# Patient Record
Sex: FEMALE | Race: WHITE | NOT HISPANIC OR LATINO | Employment: FULL TIME | URBAN - NONMETROPOLITAN AREA
[De-identification: names, ages, dates, MRNs, and addresses within clinical notes are randomized per-mention and may not be internally consistent; named-entity substitution may affect disease eponyms.]

---

## 2023-03-08 PROBLEM — K42.9 HERNIA, UMBILICAL: Status: ACTIVE | Noted: 2023-03-08

## 2023-03-08 PROBLEM — L98.9 SKIN LESION: Status: ACTIVE | Noted: 2023-03-08

## 2023-03-08 PROBLEM — M54.50 LOW BACK PAIN: Status: ACTIVE | Noted: 2023-03-08

## 2023-03-08 PROBLEM — B00.9 HERPES SIMPLEX: Status: ACTIVE | Noted: 2023-03-08

## 2023-03-08 PROBLEM — N93.9 ABNORMAL UTERINE BLEEDING (AUB): Status: ACTIVE | Noted: 2023-03-08

## 2023-03-08 PROBLEM — D50.9 IRON DEFICIENCY ANEMIA: Status: ACTIVE | Noted: 2023-03-08

## 2023-03-08 PROBLEM — M54.41 RIGHT-SIDED LOW BACK PAIN WITH SCIATICA: Status: ACTIVE | Noted: 2023-03-08

## 2023-03-08 PROBLEM — E03.9 HYPOTHYROIDISM: Status: ACTIVE | Noted: 2023-03-08

## 2023-03-08 PROBLEM — E66.01 MORBID OBESITY (MULTI): Status: ACTIVE | Noted: 2023-03-08

## 2023-03-08 RX ORDER — LEVOTHYROXINE SODIUM 100 UG/1
1 TABLET ORAL DAILY
COMMUNITY
Start: 2021-02-04 | End: 2024-03-21 | Stop reason: SDUPTHER

## 2023-03-08 RX ORDER — LEVONORGESTREL 52 MG/1
INTRAUTERINE DEVICE INTRAUTERINE
COMMUNITY

## 2023-03-15 ENCOUNTER — OFFICE VISIT (OUTPATIENT)
Dept: PRIMARY CARE | Facility: CLINIC | Age: 33
End: 2023-03-15
Payer: MEDICAID

## 2023-03-15 VITALS
DIASTOLIC BLOOD PRESSURE: 74 MMHG | WEIGHT: 280 LBS | HEART RATE: 91 BPM | SYSTOLIC BLOOD PRESSURE: 107 MMHG | BODY MASS INDEX: 42.44 KG/M2 | HEIGHT: 68 IN

## 2023-03-15 DIAGNOSIS — L03.316 CELLULITIS, UMBILICAL: ICD-10-CM

## 2023-03-15 DIAGNOSIS — E03.9 ACQUIRED HYPOTHYROIDISM: Primary | ICD-10-CM

## 2023-03-15 DIAGNOSIS — Z00.00 WELLNESS EXAMINATION: ICD-10-CM

## 2023-03-15 DIAGNOSIS — B00.9 HERPES SIMPLEX: ICD-10-CM

## 2023-03-15 DIAGNOSIS — E66.01 CLASS 3 SEVERE OBESITY DUE TO EXCESS CALORIES WITHOUT SERIOUS COMORBIDITY WITH BODY MASS INDEX (BMI) OF 40.0 TO 44.9 IN ADULT (MULTI): ICD-10-CM

## 2023-03-15 DIAGNOSIS — K42.9 UMBILICAL HERNIA WITHOUT OBSTRUCTION AND WITHOUT GANGRENE: ICD-10-CM

## 2023-03-15 PROBLEM — E66.813 CLASS 3 SEVERE OBESITY DUE TO EXCESS CALORIES WITH BODY MASS INDEX (BMI) OF 40.0 TO 44.9 IN ADULT: Status: ACTIVE | Noted: 2023-03-08

## 2023-03-15 PROCEDURE — 3008F BODY MASS INDEX DOCD: CPT | Performed by: INTERNAL MEDICINE

## 2023-03-15 PROCEDURE — 99214 OFFICE O/P EST MOD 30 MIN: CPT | Performed by: INTERNAL MEDICINE

## 2023-03-15 RX ORDER — MUPIROCIN CALCIUM 20 MG/G
CREAM TOPICAL 3 TIMES DAILY
Qty: 30 G | Refills: 0 | Status: SHIPPED | OUTPATIENT
Start: 2023-03-15 | End: 2023-03-25

## 2023-03-15 RX ORDER — DOXYCYCLINE 100 MG/1
100 CAPSULE ORAL 2 TIMES DAILY
Qty: 28 CAPSULE | Refills: 0 | Status: SHIPPED | OUTPATIENT
Start: 2023-03-15 | End: 2023-03-29

## 2023-03-15 ASSESSMENT — ENCOUNTER SYMPTOMS
ACTIVITY CHANGE: 0
BACK PAIN: 0
COUGH: 0
ABDOMINAL DISTENTION: 0
NAUSEA: 0
NUMBNESS: 0
VOMITING: 0
WEAKNESS: 0
FATIGUE: 0
APPETITE CHANGE: 0
SORE THROAT: 0
SINUS PAIN: 0
WHEEZING: 0
CHILLS: 0
DIARRHEA: 0
SINUS PRESSURE: 0
SHORTNESS OF BREATH: 0

## 2023-03-15 ASSESSMENT — PATIENT HEALTH QUESTIONNAIRE - PHQ9
2. FEELING DOWN, DEPRESSED OR HOPELESS: NOT AT ALL
1. LITTLE INTEREST OR PLEASURE IN DOING THINGS: NOT AT ALL
SUM OF ALL RESPONSES TO PHQ9 QUESTIONS 1 AND 2: 0

## 2023-03-15 NOTE — ASSESSMENT & PLAN NOTE
- did see Dr Ferrell, told needs to loose weight to have it fixed, has lost 25 lbs, discussed can loose another 15-20 lbs to have her bmi at 39 to hopefully have it fixed

## 2023-03-15 NOTE — PROGRESS NOTES
"Subjective   Patient ID: Cj Bustos is a 32 y.o. female who presents for Annual Exam (1 year), Hernia (Umbilical hernia/Present for 4+ years/Pt feels her belly button is infected because of it/She was referred to surgery; but was told she was too overweight to have surgery), Rash (Located to the top of her pelvic area and abdomen/Pt does reports itchiness /Present for about a month/Unsure if its from her infected belly button), and Thyroid Problem (Pt reports not taking her medication often).    Rash  Pertinent negatives include no congestion, cough, diarrhea, fatigue, shortness of breath, sore throat or vomiting.   Thyroid Problem  Patient reports no diarrhea or fatigue.       Patient is here  today for annual follow up   Pt reports that she has been having drainage and discomfort in her umbilical area. She did see Dr Ferrell last year who told her that she was too overweight to have surgery.     She has lost about 25 lbs since her last appt.  She has changed her eating habits and smaller portions.     Review of Systems   Constitutional:  Negative for activity change, appetite change, chills and fatigue.   HENT:  Negative for congestion, postnasal drip, sinus pressure, sinus pain and sore throat.    Respiratory:  Negative for cough, shortness of breath and wheezing.    Cardiovascular:  Negative for chest pain and leg swelling.   Gastrointestinal:  Negative for abdominal distention, diarrhea, nausea and vomiting.   Musculoskeletal:  Negative for back pain.   Skin:  Positive for rash.   Neurological:  Negative for weakness and numbness.       Objective   /74 (BP Location: Right arm, Patient Position: Sitting, BP Cuff Size: Adult)   Pulse 91   Ht 1.727 m (5' 8\")   Wt 127 kg (280 lb)   BMI 42.57 kg/m²     Physical Exam  Constitutional:       General: She is not in acute distress.     Appearance: Normal appearance.   HENT:      Head: Normocephalic.      Nose: Nose normal.      Mouth/Throat:      Mouth: " Mucous membranes are dry.      Pharynx: No oropharyngeal exudate.   Eyes:      General:         Right eye: No discharge.         Left eye: No discharge.      Extraocular Movements: Extraocular movements intact.      Pupils: Pupils are equal, round, and reactive to light.   Cardiovascular:      Rate and Rhythm: Normal rate and regular rhythm.      Heart sounds: No murmur heard.     No gallop.   Pulmonary:      Effort: Pulmonary effort is normal. No respiratory distress.      Breath sounds: Normal breath sounds. No wheezing.   Musculoskeletal:         General: No swelling. Normal range of motion.   Skin:     General: Skin is warm and dry.      Coloration: Skin is not jaundiced.      Comments: +umbilical hernia present, purulent drainge around belly button, erythema   Neurological:      General: No focal deficit present.      Mental Status: She is alert and oriented to person, place, and time.      Cranial Nerves: No cranial nerve deficit.   Psychiatric:         Mood and Affect: Mood normal.         Behavior: Behavior normal.             Assessment/Plan   Problem List Items Addressed This Visit          Digestive    Hernia, umbilical     - did see Dr Ferrell, told needs to loose weight to have it fixed, has lost 25 lbs, discussed can loose another 15-20 lbs to have her bmi at 39 to hopefully have it fixed          Relevant Medications    doxycycline (Vibramycin) 100 mg capsule    mupirocin (Bactroban) 2 % cream    Cellulitis, umbilical     - will send doxycyxcline 100mg po bid   - topical mupirocin   - call if does not improve            Endocrine/Metabolic    Hypothyroidism - Primary     - does not always take thyroid med daily,forgets   - will check tsh          Relevant Orders    TSH    Class 3 severe obesity due to excess calories with body mass index (BMI) of 40.0 to 44.9 in adult (CMS/[facility])     - doing well, has lost 25 lbs since last year             Infectious/Inflammatory    Herpes simplex     - continue  antiviral as needed             Other    Wellness examination     - ordered cbc, cmp and lipid panel  - advised to keep uptodate on pap smears with gyn          Relevant Orders    CBC and Auto Differential    Comprehensive Metabolic Panel    Lipid Panel       Final diagnoses:   [E03.9] Acquired hypothyroidism   [K42.9] Umbilical hernia without obstruction and without gangrene   [Z00.00] Wellness examination   [L03.316] Cellulitis, umbilical   [E66.01, Z68.41] Class 3 severe obesity due to excess calories without serious comorbidity with body mass index (BMI) of 40.0 to 44.9 in adult (CMS/HCC)   [B00.9] Herpes simplex

## 2023-03-17 ENCOUNTER — APPOINTMENT (OUTPATIENT)
Dept: PRIMARY CARE | Facility: CLINIC | Age: 33
End: 2023-03-17

## 2023-06-28 ENCOUNTER — APPOINTMENT (OUTPATIENT)
Dept: PRIMARY CARE | Facility: CLINIC | Age: 33
End: 2023-06-28

## 2024-03-15 ENCOUNTER — APPOINTMENT (OUTPATIENT)
Dept: PRIMARY CARE | Facility: CLINIC | Age: 34
End: 2024-03-15
Payer: MEDICAID

## 2024-03-21 ENCOUNTER — OFFICE VISIT (OUTPATIENT)
Dept: PRIMARY CARE | Facility: CLINIC | Age: 34
End: 2024-03-21
Payer: MEDICAID

## 2024-03-21 VITALS
SYSTOLIC BLOOD PRESSURE: 121 MMHG | HEIGHT: 68 IN | HEART RATE: 99 BPM | WEIGHT: 263 LBS | DIASTOLIC BLOOD PRESSURE: 79 MMHG | BODY MASS INDEX: 39.86 KG/M2

## 2024-03-21 DIAGNOSIS — B00.9 HERPES SIMPLEX: ICD-10-CM

## 2024-03-21 DIAGNOSIS — Z00.00 WELLNESS EXAMINATION: ICD-10-CM

## 2024-03-21 DIAGNOSIS — E03.9 ACQUIRED HYPOTHYROIDISM: ICD-10-CM

## 2024-03-21 DIAGNOSIS — L03.316 CELLULITIS, UMBILICAL: ICD-10-CM

## 2024-03-21 DIAGNOSIS — B00.1 COLD SORE: Primary | ICD-10-CM

## 2024-03-21 PROCEDURE — 99395 PREV VISIT EST AGE 18-39: CPT | Performed by: INTERNAL MEDICINE

## 2024-03-21 RX ORDER — MUPIROCIN 20 MG/G
OINTMENT TOPICAL 3 TIMES DAILY
Qty: 22 G | Refills: 0 | Status: SHIPPED | OUTPATIENT
Start: 2024-03-21 | End: 2024-03-31

## 2024-03-21 RX ORDER — LEVOTHYROXINE SODIUM 100 UG/1
100 TABLET ORAL DAILY
Qty: 30 TABLET | Refills: 5 | Status: SHIPPED | OUTPATIENT
Start: 2024-03-21

## 2024-03-21 RX ORDER — ACYCLOVIR 800 MG/1
800 TABLET ORAL
Qty: 50 TABLET | Refills: 3 | Status: SHIPPED | OUTPATIENT
Start: 2024-03-21

## 2024-03-21 ASSESSMENT — ENCOUNTER SYMPTOMS
WEAKNESS: 0
ABDOMINAL DISTENTION: 0
CHILLS: 0
SINUS PAIN: 0
SHORTNESS OF BREATH: 0
VOMITING: 0
NAUSEA: 0
FATIGUE: 0
APPETITE CHANGE: 0
NUMBNESS: 0
ACTIVITY CHANGE: 0
SINUS PRESSURE: 0
SORE THROAT: 0
COUGH: 0
BACK PAIN: 0
WHEEZING: 0
DIARRHEA: 0

## 2024-03-21 ASSESSMENT — PATIENT HEALTH QUESTIONNAIRE - PHQ9
1. LITTLE INTEREST OR PLEASURE IN DOING THINGS: NOT AT ALL
SUM OF ALL RESPONSES TO PHQ9 QUESTIONS 1 AND 2: 0
2. FEELING DOWN, DEPRESSED OR HOPELESS: NOT AT ALL

## 2024-03-21 NOTE — PROGRESS NOTES
"Subjective   Patient ID: Cj Bustos is a 33 y.o. female who presents for Annual Exam.  HPI  Patient is here today for annual exam.     Pt due for labs for thyroid.  Refill synthroid.     She has lost almost 20 lbs in the last year.   She is doing keto diet.     Review of Systems   Constitutional:  Negative for activity change, appetite change, chills and fatigue.   HENT:  Negative for congestion, postnasal drip, sinus pressure, sinus pain and sore throat.    Respiratory:  Negative for cough, shortness of breath and wheezing.    Cardiovascular:  Negative for chest pain and leg swelling.   Gastrointestinal:  Negative for abdominal distention, diarrhea, nausea and vomiting.   Musculoskeletal:  Negative for back pain.   Neurological:  Negative for weakness and numbness.       Objective   /79   Pulse 99   Ht 1.727 m (5' 8\")   Wt 119 kg (263 lb)   BMI 39.99 kg/m²     Physical Exam  Constitutional:       General: She is not in acute distress.     Appearance: Normal appearance.   HENT:      Head: Normocephalic.      Nose: Nose normal.      Mouth/Throat:      Pharynx: No oropharyngeal exudate.   Eyes:      General:         Right eye: No discharge.         Left eye: No discharge.      Extraocular Movements: Extraocular movements intact.      Pupils: Pupils are equal, round, and reactive to light.   Cardiovascular:      Rate and Rhythm: Normal rate and regular rhythm.      Heart sounds: No murmur heard.     No gallop.   Pulmonary:      Effort: Pulmonary effort is normal. No respiratory distress.      Breath sounds: Normal breath sounds. No wheezing.   Musculoskeletal:         General: No swelling. Normal range of motion.   Skin:     General: Skin is warm and dry.      Coloration: Skin is not jaundiced.   Neurological:      General: No focal deficit present.      Mental Status: She is alert and oriented to person, place, and time.      Cranial Nerves: No cranial nerve deficit.   Psychiatric:         Mood and " Affect: Mood normal.         Behavior: Behavior normal.         Pap 2021   Mammo --   Colonoscopy --     Flu shot declines   COVID declines     Assessment/Plan   Problem List Items Addressed This Visit       Herpes simplex    Relevant Medications    acyclovir (Zovirax) 800 mg tablet    Hypothyroidism    Relevant Medications    levothyroxine (Synthroid, Levoxyl) 100 mcg tablet    Other Relevant Orders    TSH with reflex to Free T4 if abnormal    Wellness examination    Relevant Orders    Comprehensive Metabolic Panel    Lipid Panel    Cellulitis, umbilical    Relevant Medications    mupirocin (Bactroban) 2 % ointment     Other Visit Diagnoses       Cold sore    -  Primary          Will order cmp, lipid     2. Hypothyroidism   - continue synthroid   - check tsh     3. Pap 2021     4. She had needed BMI < 40 to have umbilical hernia surgery and her BMI is a 39 now, so recommend meeting with Dr Ferrell again to discuss repair.   Final diagnoses:   [B00.1] Cold sore   [E03.9] Acquired hypothyroidism   [B00.9] Herpes simplex   [L03.316] Cellulitis, umbilical   [Z00.00] Wellness examination

## 2025-01-30 DIAGNOSIS — E03.9 ACQUIRED HYPOTHYROIDISM: ICD-10-CM

## 2025-01-30 RX ORDER — LEVOTHYROXINE SODIUM 100 UG/1
100 TABLET ORAL DAILY
Qty: 90 TABLET | Refills: 1 | Status: SHIPPED | OUTPATIENT
Start: 2025-01-30

## 2025-03-21 ENCOUNTER — APPOINTMENT (OUTPATIENT)
Dept: PRIMARY CARE | Facility: CLINIC | Age: 35
End: 2025-03-21
Payer: MEDICAID